# Patient Record
Sex: FEMALE | Race: WHITE
[De-identification: names, ages, dates, MRNs, and addresses within clinical notes are randomized per-mention and may not be internally consistent; named-entity substitution may affect disease eponyms.]

---

## 2021-08-13 ENCOUNTER — HOSPITAL ENCOUNTER (OUTPATIENT)
Dept: HOSPITAL 61 - LAB | Age: 26
End: 2021-08-13
Attending: STUDENT IN AN ORGANIZED HEALTH CARE EDUCATION/TRAINING PROGRAM
Payer: MEDICAID

## 2021-08-13 DIAGNOSIS — Z20.822: ICD-10-CM

## 2021-08-13 DIAGNOSIS — S92.352A: ICD-10-CM

## 2021-08-13 DIAGNOSIS — Z01.812: Primary | ICD-10-CM

## 2021-08-13 PROCEDURE — U0003 INFECTIOUS AGENT DETECTION BY NUCLEIC ACID (DNA OR RNA); SEVERE ACUTE RESPIRATORY SYNDROME CORONAVIRUS 2 (SARS-COV-2) (CORONAVIRUS DISEASE [COVID-19]), AMPLIFIED PROBE TECHNIQUE, MAKING USE OF HIGH THROUGHPUT TECHNOLOGIES AS DESCRIBED BY CMS-2020-01-R: HCPCS

## 2021-08-16 ENCOUNTER — HOSPITAL ENCOUNTER (OUTPATIENT)
Dept: HOSPITAL 61 - SURG | Age: 26
Discharge: HOME | End: 2021-08-16
Attending: STUDENT IN AN ORGANIZED HEALTH CARE EDUCATION/TRAINING PROGRAM
Payer: COMMERCIAL

## 2021-08-16 VITALS — HEIGHT: 63 IN | BODY MASS INDEX: 23.79 KG/M2 | WEIGHT: 134.26 LBS

## 2021-08-16 VITALS — SYSTOLIC BLOOD PRESSURE: 118 MMHG | DIASTOLIC BLOOD PRESSURE: 74 MMHG

## 2021-08-16 VITALS
DIASTOLIC BLOOD PRESSURE: 59 MMHG | DIASTOLIC BLOOD PRESSURE: 59 MMHG | SYSTOLIC BLOOD PRESSURE: 102 MMHG | SYSTOLIC BLOOD PRESSURE: 102 MMHG
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
## 2021-08-16 DIAGNOSIS — Z79.899: ICD-10-CM
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
## 2021-08-16 DIAGNOSIS — Z98.890: ICD-10-CM

## 2021-08-16 DIAGNOSIS — S92.352A: Primary | ICD-10-CM

## 2021-08-16 PROCEDURE — 81025 URINE PREGNANCY TEST: CPT

## 2021-08-16 PROCEDURE — 73630 X-RAY EXAM OF FOOT: CPT

## 2021-08-16 PROCEDURE — A6402 STERILE GAUZE <= 16 SQ IN: HCPCS

## 2021-08-16 PROCEDURE — A6258 TRANSPARENT FILM >16<=48 IN: HCPCS

## 2021-08-16 PROCEDURE — A4657 SYRINGE W/WO NEEDLE: HCPCS

## 2021-08-16 PROCEDURE — A6223 GAUZE >16<=48 NO W/SAL W/O B: HCPCS

## 2021-08-16 PROCEDURE — C1713 ANCHOR/SCREW BN/BN,TIS/BN: HCPCS

## 2021-08-16 PROCEDURE — 28485 OPTX METATARSAL FX EACH: CPT

## 2021-08-16 PROCEDURE — A4930 STERILE, GLOVES PER PAIR: HCPCS

## 2021-08-16 PROCEDURE — A6449 LT COMPRES BAND >=3" <5"/YD: HCPCS

## 2021-08-16 PROCEDURE — A6253 ABSORPT DRG > 48 SQ IN W/O B: HCPCS

## 2021-08-16 PROCEDURE — C1769 GUIDE WIRE: HCPCS

## 2021-08-16 RX ADMIN — FENTANYL CITRATE PRN MCG: 50 INJECTION INTRAMUSCULAR; INTRAVENOUS at 11:43

## 2021-08-16 RX ADMIN — FENTANYL CITRATE PRN MCG: 50 INJECTION INTRAMUSCULAR; INTRAVENOUS at 12:02

## 2021-08-16 NOTE — PDOC1
History and Physical


Date of Admission


Date of Admission


DATE: 8/16/21 


TIME: 09:06





Identification/Chief Complaint


Chief Complaint


Left 5th metatarsal fracture





Source


Source:  Patient





History of Present Illness


History of Present Illness


Ms Nuenz is a 27yo F with no significant past medical history who comes in 

today for elective ORIF left fifth metatarsal fracture.  On 7/31/2021 she was at

the "[employer]" and was walking in high heels when she turned her 

ankle and felt significant pain.  She was seen in the emergency department in 

[place] and splinted and given a prescription for hydrocodone and elected to 

ride home with her family.  She was seen outpatient by foot and ankle surgery.  

No numbness tingling pain is 4-5 out of 10 improved with elevation pain 

medication.  She currently lives at home with her 5-year-old and works at [employer].  Never previously hospitalized never experienced anesthesia or require 

blood transfusion.  Preoperative hCG negative and COVID-19 negative.  She is 

up-to-date on her childhood vaccinations and has yet to receive the COVID-19 

vaccination.





Past Medical History


Cardiovascular:  No pertinent hx





Past Surgical History


Past Surgical History:  No pertinent history





Family History


Family History:  Parent (Mother alive Father alive.  No known past family 

history)





Social History


Smoke:  No


ALCOHOL:  none


Drugs:  None





Current Medications


Current Medications





Current Medications


Fentanyl Citrate (Fentanyl 2ml Vial) 25 mcg PRN Q5MIN  PRN IVP MILD PAIN 1-3;  

Start 8/16/21 at 06:00;  Stop 8/17/21 at 05:59


Fentanyl Citrate (Fentanyl 2ml Vial) 50 mcg PRN Q5MIN  PRN IVP MODERATE PAIN 4-

6;  Start 8/16/21 at 06:00;  Stop 8/17/21 at 05:59


Morphine Sulfate (Morphine Sulfate) 1 mg PRN Q10MIN  PRN IVP SEVERE PAIN 7-10;  

Start 8/16/21 at 06:00;  Stop 8/17/21 at 05:59


Ringer's Solution 1,000 ml @  30 mls/hr Q24H IV  Last administered on 8/16/21at 

08:24;  Start 8/16/21 at 06:00;  Stop 8/16/21 at 17:59


Hydromorphone HCl (Dilaudid) 0.5 mg PRN Q10MIN  PRN IVP SEVERE PAIN 7-10, 2nd 

CHOICE;  Start 8/16/21 at 06:00;  Stop 8/17/21 at 05:59


Prochlorperazine Edisylate (Compazine) 5 mg PACU PRN  PRN IVP NAUSEA, MRX1;  

Start 8/16/21 at 06:00;  Stop 8/17/21 at 05:59


Cefazolin Sodium (Ancef) 1 gm 1X PREOP  PRN IVP PRIOR TO PROCEDURE;  Start 

8/16/21 at 06:00


Propofol (Diprivan) 200 mg STK-MED ONCE IV ;  Start 8/16/21 at 08:07;  Stop 

8/16/21 at 08:07;  Status DC


Famotidine (Pepcid Vial) 20 mg STK-MED ONCE .ROUTE ;  Start 8/16/21 at 08:07;  

Stop 8/16/21 at 08:07;  Status DC


Ondansetron HCl (Zofran) 4 mg STK-MED ONCE .ROUTE ;  Start 8/16/21 at 08:07;  

Stop 8/16/21 at 08:07;  Status DC


Dexamethasone Sodium Phosphate (Decadron) 4 mg STK-MED ONCE .ROUTE ;  Start 

8/16/21 at 08:07;  Stop 8/16/21 at 08:07;  Status DC


Fentanyl Citrate (Fentanyl 2ml Vial) 100 mcg STK-MED ONCE .ROUTE ;  Start 

8/16/21 at 08:07;  Stop 8/16/21 at 08:07;  Status DC


Midazolam HCl (Versed) 2 mg STK-MED ONCE .ROUTE ;  Start 8/16/21 at 08:07;  Stop

8/16/21 at 08:07;  Status DC


Povidone Iodine (Betadine Oint) 28 arlyn STK-MED ONCE TP ;  Start 8/16/21 at 08:51

;  Stop 8/16/21 at 08:51;  Status DC


Lidocaine HCl (Xylocaine 1% Pf 30ml Vial) 30 ml STK-MED ONCE .ROUTE ;  Start 

8/16/21 at 08:52;  Stop 8/16/21 at 08:52;  Status DC


Dexamethasone Sodium Phosphate (Decadron) 4 mg STK-MED ONCE .ROUTE ;  Start 

8/16/21 at 08:52;  Stop 8/16/21 at 08:52;  Status DC


Bupivacaine HCl (Sensorcaine Mpf 0.5%) 30 ml STK-MED ONCE .ROUTE ;  Start 

8/16/21 at 08:52;  Stop 8/16/21 at 08:52;  Status DC


Dexamethasone Sodium Phosphate (Decadron) 4 mg STK-MED ONCE .ROUTE ;  Start 

8/16/21 at 08:52;  Stop 8/16/21 at 08:53;  Status DC





Active Scripts


Active


Reported


Ibuprofen 400 Mg Tablet 400 Mg PO PRN Q6HRS PRN


Tylenol Extra Strength (Acetaminophen) 500 Mg Powd.pack 500 Mg PO AS  NEEDED





Allergies


Allergies:  


Coded Allergies:  


     No Known Drug Allergies (Unverified , 8/16/21)





ROS


General:  No: Chills, Night Sweats, Fatigue, Malaise, Appetite, Other


PSYCHOLOGICAL ROS:  No: Anxiety, Behavioral Disorder, Concentration difficultie,

Decreased libido, Depression, Disorientation, Hallucinations, Hostility, I

rritablity, Memory difficulties, Mood Swings, Obsessive thoughts, Physical 

abuse, Sexual abuse, Sleep disturbances, Suicidal ideation, Other


Eyes:  No Blurry vision, No Decreased vision, No Double vision, No Dry eyes, No 

Excessive tearing, No Eye Pain, No Itchy Eyes, No Loss of vision, No 

Photophobia, No Scotomata, No Uses contacts, No Uses glasses, No Other


HEENT:  No: Heacaches, Visual Changes, Hearing change, Nasal congestion, Nasal 

discharge, Oral lesions, Sinus pain, Sore Throat, Epistaxis, Sneezing, Snoring, 

Tinnitus, Vertigo, Vocal changes, Other


ALLERGY AND IMMUNOLOGY:  No: Hives, Insect Bite Sensitivity, Itchy/Watery Eyes, 

Nasal Congestion, Post Nasal Drip, Seasonal Allergies, Other


Hematological and Lymphatic:  No: Bleeding Problems, Blood Clots, Blood 

Transfusions, Brusing, Night Sweats, Pallor, Swollen Lymph Nodes, Other


ENDOCRINE:  No: Breast Changes, Galactorrhea, Hair Pattern Changes, Hot Flashes,

Malaise/lethargy, Mood Swings, Palpitations, Polydipsia/polyuria, Skin Changes, 

Temperature Intolerance, Unexpected Weight Changes, Other


Breast:  No New/Changing Breast Lumps, No Nipple changes, No Nipple discharge, 

No Other


Respiratory:  No: Cough, Hemoptysis, Orthopnea, Pleuritic Pain, Shortness of 

breath, SOB with excertion, Sputum Changes, Stridor, Tachypnea, Wheezing, Other


Cardiovascular:  No Chest Pain, No Palpitations, No Orthopnea, No Paroxysmal 

Noc. Dyspnea, No Edema, No Lt Headedness, No Other


Gastrointestinal:  No Nausea, No Vomiting, No Abdominal Pain, No Diarrhea, No 

Constipation, No Melena, No Hematochezia, No Other


Genitourinary:  No Dysuria, No Frequency, No Incontinence, No Hematuria, No 

Retention, No Discharge, No Urgency, No Pain, No Flank Pain, No Other, No , No ,

No , No , No , No , No 


Musculoskeletal:  Yes Gait Disturbance, Yes Joint Pain, Yes Joint Stiffness; 


   No Joint Swelling, No Muscle Pain, No Muscular Weakness, No Pain In:, No 

Swelling In:, No Other


Neurological:  Yes Gait Disturbance; 


   No Behavorial Changes, No Bowel/Bladder ControlChng, No Confusion, No 

Dizziness, No Headaches, No Impaired Coord/balance, No Memory Loss, No 

Numbness/Tingling, No Seizures, No Speech Problems, No Tremors, No Visual 

Changes, No Weakness, No Other


Skin:  No Dry Skin, No Eczema, No Hair Changes, No Lumps, No Mole Changes, No 

Mottling, No Nail Changes, No Pruritus, No Rash, No Skin Lesion Changes, No 

Other, No Acne





Physical Exam


General:  Alert, Oriented X3, Cooperative, No acute distress


HEENT:  Atraumatic, PERRLA, EOMI, Mucous membr. moist/pink


Lungs:  Clear to auscultation, Normal air movement


Heart:  S1S2, RRR, no thrills, no rubs, no gallops, no murmurs


Abdomen:  Normal bowel sounds, Soft, No tenderness, No hepatosplenomegaly, No 

masses


Rectal Exam:  not examined


Extremities:  No clubbing, No cyanosis, No edema, Normal pulses, No tenderne

ss/swelling


Skin:  No rashes, No breakdown, No significant lesion


Neuro:  Normal speech, Strength at 5/5 X4 ext, Normal tone, Sensation intact, 

Cranial nerves 3-12 NL, Reflexes 2+


Psych/Mental Status:  Mental status NL, Mood NL





Vitals


Vitals





Vital Signs








  Date Time  Temp Pulse Resp B/P (MAP) Pulse Ox O2 Delivery O2 Flow Rate FiO2


 


8/16/21 08:18 97.3 75 16  98   





 97.3       


 


8/16/21 08:13    118/74  Room Air  











Labs


Labs





Laboratory Tests








Test


 8/16/21


07:19


 


Bedside Urine HCG, Qualitative


 Hcg negative


(Negative)








Laboratory Tests








Test


 8/16/21


07:19


 


Bedside Urine HCG, Qualitative


 Hcg negative


(Negative)











Images


Images


Left foot radiograph: 8/12/2021


A distracted avulsion fracture is again noted at the base of the fifth 

metatarsal. The fracture gap measures 6 mm. No bridging callus is yet present. 

The patient is in splint. Joint spaces and alignment elsewhere are normal.





IMPRESSION:


1. Distracted avulsion fracture at the base of the fifth metatarsal.





VTE Prophylaxis Ordered


VTE Prophylaxis Devices:  Yes


VTE Pharmacological Prophylaxi:  No





Assessment/Plan


Assessment/Plan


A/P:


Left 5th metatarsal fracture -no further testing prior to planned surgery.  Pain

control with hydrocodone with Cleocin as possible.  Weightbearing per surgery.  

Okay to discharge after surgery today.





[phone] for any further questions.





Justifications for Admission


Other Justification














ERMA KAPLAN MD        Aug 16, 2021 09:16

## 2021-08-16 NOTE — PDOC4
OPERATIVE NOTE


Date:


Date:  Aug 16, 2021





Pre-Op Diagnosis:


zone 1, comminuted, displaced avulsion fracture off the L 5th met





Post-Op Diagnosis:


Same as above





Procedure Performed:


L 5th met base fracture fragment excision [<15% articular involvement], and PB 

tendon reattachment with a 2.7mm suture anchor





Surgeon:


Francisco Villalobos DPM





Anesthesia Type:


General





Blood Loss:


5cc





Specimans Obtained:


None





Findings:


Soft, comminuted, posteriorly displaced avulsion fracture of the L 5th met base.

The PB tendon insertion at the dorsolateral 5th met base was 50% intact and the 

plantar 50% was attached to the fracture fragment.





Complications:


None





Operative Note:


Under mild sedation, patient was brought into the operating room and placed on 

operating table in the supine position.  A timeout was performed to confirm 

patient's identity, procedure and procedure site.  Following general anesthesia 

and IV 2 g Ancef, a well-padded left thigh tourniquet was applied.  The left 

lower extremity was then prepped, scrubbed and draped using standard aseptic 

techniques.  An intraoperative C arm was used to chadwick the fracture site, lateral

fifth metatarsal base.  Then, an Esmarch was used to exsanguinate the left lower

extremity with thigh pressure set at 250 millimercury.





The attention was directed to the dorsal lateral aspect of the fifth metatarsal 

base where a linear incision was made.  The incision was carried deep with 

Metzenbaum scissors.  The sural nerve was encountered and protected and 

retracted plantarly.  Using blunt dissection, the periosteum and fracture site 

were visualized.  The fracture site was irrigated with saline and the hematoma 

was evacuated carefully with a small curette.  The proximal fracture fragments 

were noted comminuted and soft.  The fifth TMT involvement was less than 15%.  

The PB tendon insertion site was intact at the dorsal 50% of the fifth 

metatarsal base.  However the plantar 50% was attached to the proximal fracture 

fragments.  With careful dissection, the plantar 50% of the PB tendon was freed 

from the fracture site to allow adequate fracture immobilization and therefore 

reduction.  Further inspection at the cuboid articular surface was unremarkable 

for OCD lesions.  A hook plate from [employer] was placed to engage the proximal fr

agment and tamped distally to reduce the fracture fragment.  However, due to the

soft and comminuted nature of the fracture fragments, the fragments were not 

reduced or purchased by the plate adequately.  This was confirmed with 

intraoperative x-ray as well.  Then the decision was to forego the plate and 

ORFI and instead, excise the proximal comminuted fragments and reattach the PB 

tendon to the distal fifth metatarsal base.  This approach was documented and 

supported by literature especially when the intra-articular involvement was less

than 15%.  In that case, the proximal fragments were carefully freed at the 

proximal plantar 50% and excised with goals to protect the dorsal PB tendon 

insertion site as much as possible.  Then, a 2.7 mm suture anchor from [employer] 

was placed after per-drilling at the level of the dorsal lateral fifth 

metatarsal base just distal to the fracture site.  Intraoperative x-ray noted 

adequate and satisfactory hardware purchase.  Then 2-0 FiberWire was used to 

repair and reattach the PB tendon to the fifth metatarsal base under mild 

tension without the need for tendon lengthening.  Additional periosteum-tendon-

plantar fascial repair with 2-0 FiberWire using running and locking stitches 

were applied to enforce the repair with the foot held at dorsiflexion and 

eversion.  The surgical site was irrigated with copious saline solution.  The 

wound was closed in layers with 3-0 Vicryl and 4-0 nylon.  10 cc of one-to-one 

mixture of 1% lidocaine plain, 0.25% percent Marcaine plain was infiltrated to 

the surgical site.  The site was dressed with Xeroform and covered with 4 x 4 

gauze.  Left lower extremity was then immobilized in a well-padded a posterior 

splint with ankle held at 90 degrees and everted.  Tourniquet was let down at 

this time and adequate digital perfusion was noted to the left of digits.





Patient tolerated the procedure and anesthesia well with vital signs stable and 

neurovascular status intact.  She was then transferred to PACU for continuous 

recovery.  Postoperative instructions were given before surgery.  Patient has a 

2-week follow-up with me in the clinic.











FRANCISCO VILLALOBOS DPM               Aug 16, 2021 11:47

## 2021-08-16 NOTE — RAD
Three-view left foot study



Clinical indications: Postoperative study.



FINDINGS: 2 metallic screws are seen within the proximal fifth metatarsal bone. Partial resection of 
the proximal fifth metatarsal bone is seen. No acute fracture or dislocation or lytic process is seen
 elsewhere.



IMPRESSION: Postoperative changes of the fifth metatarsal bone.



Electronically signed by: Noah Mckeon MD (8/16/2021 12:45 PM) [id number]

## 2021-12-28 ENCOUNTER — HOSPITAL ENCOUNTER (EMERGENCY)
Dept: HOSPITAL 61 - ER | Age: 26
Discharge: LEFT BEFORE DISCHARGE | End: 2021-12-28
Payer: COMMERCIAL

## 2021-12-28 DIAGNOSIS — M79.10: Primary | ICD-10-CM

## 2021-12-28 DIAGNOSIS — R05.9: ICD-10-CM

## 2021-12-28 DIAGNOSIS — R51.9: ICD-10-CM

## 2021-12-28 DIAGNOSIS — Z53.21: ICD-10-CM
